# Patient Record
Sex: MALE | Race: WHITE | NOT HISPANIC OR LATINO | Employment: UNEMPLOYED | URBAN - METROPOLITAN AREA
[De-identification: names, ages, dates, MRNs, and addresses within clinical notes are randomized per-mention and may not be internally consistent; named-entity substitution may affect disease eponyms.]

---

## 2018-02-21 ENCOUNTER — HOSPITAL ENCOUNTER (EMERGENCY)
Facility: MEDICAL CENTER | Age: 23
End: 2018-02-23
Attending: EMERGENCY MEDICINE

## 2018-02-21 DIAGNOSIS — R45.851 SUICIDAL IDEATION: ICD-10-CM

## 2018-02-21 DIAGNOSIS — F10.929 ACUTE ALCOHOLIC INTOXICATION WITH COMPLICATION (HCC): ICD-10-CM

## 2018-02-21 LAB
AMPHET UR QL SCN: NEGATIVE
BARBITURATES UR QL SCN: NEGATIVE
BENZODIAZ UR QL SCN: NEGATIVE
BZE UR QL SCN: NEGATIVE
CANNABINOIDS UR QL SCN: NEGATIVE
METHADONE UR QL SCN: NEGATIVE
OPIATES UR QL SCN: NEGATIVE
OXYCODONE UR QL SCN: NEGATIVE
PCP UR QL SCN: NEGATIVE
POC BREATHALIZER: 0.01 PERCENT (ref 0–0.01)
POC BREATHALIZER: 0.3 PERCENT (ref 0–0.01)
PROPOXYPH UR QL SCN: NEGATIVE

## 2018-02-21 PROCEDURE — 99285 EMERGENCY DEPT VISIT HI MDM: CPT

## 2018-02-21 PROCEDURE — 80307 DRUG TEST PRSMV CHEM ANLYZR: CPT

## 2018-02-21 PROCEDURE — 700111 HCHG RX REV CODE 636 W/ 250 OVERRIDE (IP): Performed by: EMERGENCY MEDICINE

## 2018-02-21 PROCEDURE — 302970 POC BREATHALIZER: Performed by: EMERGENCY MEDICINE

## 2018-02-21 PROCEDURE — 96372 THER/PROPH/DIAG INJ SC/IM: CPT

## 2018-02-21 RX ORDER — HALOPERIDOL 5 MG/ML
5 INJECTION INTRAMUSCULAR ONCE
Status: COMPLETED | OUTPATIENT
Start: 2018-02-21 | End: 2018-02-21

## 2018-02-21 RX ADMIN — HALOPERIDOL LACTATE 5 MG: 5 INJECTION, SOLUTION INTRAMUSCULAR at 07:45

## 2018-02-21 ASSESSMENT — PAIN SCALES - GENERAL: PAINLEVEL_OUTOF10: 0

## 2018-02-21 ASSESSMENT — LIFESTYLE VARIABLES
DO YOU DRINK ALCOHOL: YES
TOTAL SCORE: 1
EVER FELT BAD OR GUILTY ABOUT YOUR DRINKING: YES
TOTAL SCORE: 1
HAVE YOU EVER FELT YOU SHOULD CUT DOWN ON YOUR DRINKING: NO
HAVE PEOPLE ANNOYED YOU BY CRITICIZING YOUR DRINKING: NO
TOTAL SCORE: 1
EVER HAD A DRINK FIRST THING IN THE MORNING TO STEADY YOUR NERVES TO GET RID OF A HANGOVER: NO
CONSUMPTION TOTAL: INCOMPLETE

## 2018-02-21 NOTE — ED NOTES
Pt trashing around in bed, verbally threatening staff, redirected, calming measures tried, lights off,per request. All non effective.

## 2018-02-21 NOTE — ED NOTES
"Woke up to speak with registration. Appears annoyed that he was \"in binds for no reason.\" But otherwise, calm at this time.   "

## 2018-02-21 NOTE — ED NOTES
Pt requesting to go to restroom security called and patient coming out of restraints and will be escorted to restroom

## 2018-02-21 NOTE — CONSULTS
"[facility]   TRIAGE ASSESSMENT    Name: Gregg Nelson  MRN: [id number]  : 1995  Age: 22 y.o.  Date of assessment: 2018  PCP: No primary care provider on file.  Persons in attendance: Patient    CHIEF COMPLAINT/PRESENTING ISSUE (as stated by OWEN Mir):   Chief Complaint   Patient presents with   • Suicidal Ideation   • Alcohol Intoxication        CURRENT LIVING SITUATION/SOCIAL SUPPORT: pt lives with his grandmother.  He reports that his biological father commit suicide when pt was a child.  His mother is a paranoid schizophrenic who couldn't care for him.  He also has a step father who he describes as an \"idiot\" and \"piece of shit.\"    BEHAVIORAL HEALTH TREATMENT HISTORY  Does patient/parent report a history of prior behavioral health treatment for patient?   No:    SAFETY ASSESSMENT - SELF  Does patient acknowledge current or past symptoms of dangerousness to self? yes  Does parent/significant other report patient has current or past symptoms of dangerousness to self? N\A  Does presenting problem suggest symptoms of dangerousness to self? Yes:     Past Current    Suicidal Thoughts: [x]  [x]    Suicidal Plans: []  []    Suicidal Intent: []  [x]    Suicide Attempts: []  []    Self-Injury []  []      For any boxes checked above, provide detail: pt reports SI \"for awhile\"    History of suicide by family member: yes - father  History of suicide by friend/significant other: no  Recent change in frequency/specificity/intensity of suicidal thoughts or self-harm behavior? yes - increased  Current access to firearms, medications, or other identified means of suicide/self-harm? no  If yes, willing to restrict access to means of suicide/self-harm? no  Protective factors present:  pt unable to name any at this time.    SAFETY ASSESSMENT - OTHERS  Does patient acknowledge current or past symptoms of aggressive behavior or risk to others? no  Does parent/significant other report patient has current or past " "symptoms of aggressive behavior or risk to others?  no  Does presenting problem suggest symptoms of dangerousness to others? No    Crisis Safety Plan completed and copy given to patient? later    ABUSE/NEGLECT SCREENING  Does patient report feeling “unsafe” in his/her home, or afraid of anyone?  no  Does patient report any history of physical, sexual, or emotional abuse?  no  Does parent or significant other report any of the above? no  Is there evidence of neglect by self?  no  Is there evidence of neglect by a caregiver? no  Does the patient/parent report any history of CPS/APS/police involvement related to suspected abuse/neglect or domestic violence? no  Based on the information provided during the current assessment, is a mandated report of suspected abuse/neglect being made?  No    SUBSTANCE USE SCREENING  Yes:  Francisco all substances used in the past 30 days:      Last Use Amount   [x]   Alcohol daily Til drunk   []   Marijuana     []   Heroin     []   Prescription Opioids  (used without prescription, for    recreation, or in excess of prescribed amount)     []   Other Prescription  (used without prescription, for    recreation, or in excess of prescribed amount)     []   Cocaine      []   Methamphetamine     []   \"\" drugs (ectasy, MDMA)     []   Other substances      Pt sarcastically jokes about his drinking, that he only drinks \"while I'm conscious.\"  UDS results: negative  Breathalyzer results: 0.299@4:38am, 0.0@1358    What consequences does the patient associate with any of the above substance use and or addictive behaviors? Other: ending up here    Risk factors for detox (check all that apply):  []  Seizures   []  Diaphoretic (sweating)   []  Tremors   []  Hallucinations   []  Increased blood pressure   []  Decreased blood pressure   []  Other   []  None      [x] Patient education on risk factors for detoxification and instructed to return to ER as needed.      MENTAL STATUS   Participation: Active " verbal participation and Guarded  Grooming: Disheveled  Orientation: Alert and Fully Oriented  Behavior: Calm  Eye contact: Limited  Mood: Depressed  Affect: Constricted  Thought process: Logical  Thought content: Within normal limits  Speech: Rate within normal limits and Volume within normal limits  Perception: Within normal limits  Memory:  No gross evidence of memory deficits  Insight: Limited  Judgment:  Limited  Other:    Collateral information: no past visits to our ER.  Pt claims this is his first time in a hospital.  Source:  [] Significant other present in person:   [] Significant other by telephone  [] [facility]   [] [facility] Nursing Staff  [x] [facility]  [] Other:     [] Unable to complete full assessment due to:  [] Acute intoxication  [] Patient declined to participate/engage  [] Patient verbally unresponsive  [] Significant cognitive deficits  [] Significant perceptual distortions or behavioral disorganization  [] Other:      CLINICAL IMPRESSIONS:  Primary:  Suicidal Ideation  Secondary:  Depression       IDENTIFIED NEEDS/PLAN:  [Trigger DISPOSITION list for any items marked]    []  Imminent safety risk - self [] Imminent safety risk - others   []  Acute substance withdrawal []  Psychosis/Impaired reality testing   []  Mood/anxiety []  Substance use/Addictive behavior   []  Maladaptive behaviro []  Parent/child conflict   []  Family/Couples conflict []  Biomedical   []  Housing []  Financial   []   Legal  Occupational/Educational   []  Domestic violence []  Other:     Disposition: Actively being addressed by [facility] and [facility]    Does patient express agreement with the above plan? yes    Referral appointment(s) scheduled? N\A    Alert team only:  Pt able to evaluated at 2pm; had been too intoxicated til then.  Pt had been in restraints most of the morning d/t aggressive behaviors with staff.  Pt had also been verbally aggressive with staff and socially  "inappropriate, keeping his genitals purposefully exposed.    Pt was calm and cooperative with assessment.  He was able to voice why he is here, that he fell through the glass table and started cutting himself to commit suicide.  He has a sarcastic, dry sense of humor; self deprecating at times.  He is vaguely irritable and expresses hopelessness.    He reports taking an antidepressant for a while about 7 years ago.  \"But then I felt better so, surprise, I stopped taking it.  Sounds familiar?\"  He can't recall the name of the med.  Pt is insightful about mental illness, likely d/t mother's struggles with paranoid schizophrenia, (which has gone mostly unmedicated, per pt, who describes her as \"slipping into her hole of delusions.\")  Pt denies any SI/HI/AH/VH this morning, despite making serious gestures just last evening.  He doesn't feel he needs rehab for his drinking, actually joking about needing a beer.  He does endorse depression, and I discussed with him the hereditary aspect of mental illness.  He remarked he never thought about it that way, that he always assumed he had situational depression because of his life circumstances.  I offered to give him a list of resources, (which is limited by the fact that he has no insurance.)  He doesn't feel he can get immediate services because he has lost his ID, and will have difficulty replacing because \"my birth certificate isn't a valid original.\"    Pt placed on hold for suicidal ideation.  Hold given to ALEKSANDRA De Paz at 4:45pm.  Dr. Canseco to assess pt tomorrow morning.      I have discussed findings and recommendations with Dr. Singh and Dr. Canseco, who are in agreement with these recommendations.       Gely Low R.N.  2/21/2018              "

## 2018-02-21 NOTE — ED NOTES
"[place] placed again over pt, threw it off using fingers..   \" your going look at my tunde all day\".   emotional support given  "

## 2018-02-21 NOTE — ED NOTES
"Pt offered toilet, stated \"fuck you and all your nice nice bullshit, and fuck those people watching me\"..  Pt educated on restraint release requirements, language, and behavior.   Pt resistant to care, vitals signs were obtained.  Pulses present all limbs, no distress noted. No skin break down noted.   "

## 2018-02-21 NOTE — ED PROVIDER NOTES
"ED Provider Note    Scribed for Andriy Bella M.D. by Angela John. 2/21/2018  4:07 AM    Means of arrival: ambulance   History obtained from: patient   History limited by: none     CHIEF COMPLAINT  Chief Complaint   Patient presents with   • Suicidal Ideation   • Alcohol Intoxication       HPI  Gregg Nelson is a 22 y.o. male who presents to the Emergency Department via ambulance with complaints of suicidal ideation prior to arrival. Per nurses note the patient was drinking at his grandmother's house and began cutting himself with broken glass. He states that he is suicidal on exam and his plan would be \"a drug overdose or alcohol abuse\". He reports recently having multiple negative life events that have exacerbated his suicidal ideation. Patient admits to suicide attempts in the past with \"a lot of alcohol\".  No complaints of fevers. No other acute complaints or concerns.     REVIEW OF SYSTEMS  Pertinent positives include SI.   Pertinent negatives include no fever.    All other systems reviewed and negative.    C    PAST MEDICAL HISTORY   No pertinent past medical history.     SURGICAL HISTORY  patient denies any surgical history    SOCIAL HISTORY  Social History   Substance Use Topics   • Smoking status: Never Smoker   • Smokeless tobacco: Never Used   • Alcohol use Yes      History   Drug use: Unknown       FAMILY HISTORY  No family history noted    CURRENT MEDICATIONS  Current medications can be reviewed in the nurse's note.     ALLERGIES  No known drug allergies    PHYSICAL EXAM  VITAL SIGNS: /107   Pulse (!) 132   Temp 37.1 °C (98.8 °F)   Resp 18   Ht 1.778 m (5' 10\")   Wt (!) 145.2 kg (320 lb)   SpO2 95%   BMI 45.92 kg/m²     Nursing note and vitals reviewed.  Constitutional: Well-developed and well-nourished. No distress. Patient smells of alcohol and has slurred speech. He is walking around the room and turning on the television.   HENT: Head is normocephalic and atraumatic. Oropharynx is " clear and moist without exudate or erythema.   Eyes: Pupils are equal, round, and reactive to light. Conjunctiva are normal.   Cardiovascular: Normal rate and regular rhythm. No murmur heard. Normal radial pulses.  Pulmonary/Chest: Breath sounds normal. No wheezes or rales.   Abdominal: Soft and non-tender. No distention    Musculoskeletal: Extremities exhibit normal range of motion without edema or tenderness.   Neurological: Awake, alert and oriented to person, place, and time. Slurred speech.   Skin: Skin is warm and dry. No rash.   Psychiatric: Patient confirms SI with a plan to overdose on drugs or alcohol.       DIAGNOSTIC STUDIES / PROCEDURES    LABS  Results for orders placed or performed during the hospital encounter of 02/21/18   URINE DRUG SCREEN (TRIAGE)   Result Value Ref Range    Amphetamines Urine Negative Negative    Barbiturates Negative Negative    Benzodiazepines Negative Negative    Cocaine Metabolite Negative Negative    Methadone Negative Negative    Opiates Negative Negative    Oxycodone Negative Negative    Phencyclidine -Pcp Negative Negative    Propoxyphene Negative Negative    Cannabinoid Metab Negative Negative   POC BREATHALIZER   Result Value Ref Range    POC Breathalizer 0.299 (A) 0.00 - 0.01 Percent     All labs reviewed by me.    RADIOLOGY  No orders to display     The radiologist's interpretation of all radiological studies have been reviewed by me.    COURSE & MEDICAL DECISION MAKING  Nursing notes, VS, PMSFHx reviewed in chart.     Review of past medical records shows the patient has no prior ER visits.     4:07 AM - Patient seen and examined at bedside. Ordered urine drug screen  to evaluate his symptoms.     4: 09 AM- Paged life skills for a consult with the patient.     11:20 AM the patient presents today with alcohol intoxication and suicidal ideation. We are awaiting sobriety to continue with psychiatric assessment.     IMPRESSION  1. Acute alcoholic intoxication with  complication (CMS-HCC)    2. Suicidal ideation          IAngela (Scribe), am scribing for, and in the presence of, Andriy Bella M.D..    Electronically signed by: Angela John (Scribe), 2/21/2018    Andriy PRIEST M.D. personally performed the services described in this documentation, as scribed by Angela John in my presence, and it is both accurate and complete.    The note accurately reflects work and decisions made by me.  Andriy Bella  2/21/2018  11:21 AM

## 2018-02-21 NOTE — ED NOTES
Security called, curtains removed, pt educated on safety again... Pt on bed at this time.   Tearful..  security present.. Sitter present as well, charge RN notified of high SAD score.

## 2018-02-21 NOTE — ED TRIAGE NOTES
Pt had been drinking at his house, he currently resides with his grandmother and fell through a glass table where he then attempted to use glass shards to cut himself in a suicide attempt.  Pt has small cuts on hands, lower abdomen..  Pt attempted to slice neck, small superficial cut on right side of neck.    Pt has had previous suicide attempts by slicing wrists, ingestion of pills.   Currently endorses SI, denies hi a/v hallucinations.  searched and gown.

## 2018-02-21 NOTE — ED NOTES
Pt refuses to sit down on , hitting walls attempting to dislodge tv. Restraints for pt safety placed on at 0515

## 2018-02-22 PROCEDURE — 700102 HCHG RX REV CODE 250 W/ 637 OVERRIDE(OP): Performed by: PSYCHIATRY & NEUROLOGY

## 2018-02-22 PROCEDURE — A9270 NON-COVERED ITEM OR SERVICE: HCPCS | Performed by: PSYCHIATRY & NEUROLOGY

## 2018-02-22 RX ORDER — RISPERIDONE 1 MG/1
0.5 TABLET ORAL 2 TIMES DAILY
Status: DISCONTINUED | OUTPATIENT
Start: 2018-02-22 | End: 2018-02-23 | Stop reason: HOSPADM

## 2018-02-22 RX ORDER — ESCITALOPRAM OXALATE 10 MG/1
10 TABLET ORAL DAILY
Status: DISCONTINUED | OUTPATIENT
Start: 2018-02-22 | End: 2018-02-23 | Stop reason: HOSPADM

## 2018-02-22 RX ADMIN — RISPERIDONE 0.5 MG: 1 TABLET, FILM COATED ORAL at 21:06

## 2018-02-22 RX ADMIN — ESCITALOPRAM OXALATE 10 MG: 10 TABLET ORAL at 15:07

## 2018-02-22 NOTE — ED PROVIDER NOTES
ED Provider Note Addendum    Scribed for Andriy Bella M.D. by Justo Robin on 2/22/2018 at 10:31 AM.     This is an addendum to the note on Gregg Nelson.  For further details and full chart information, see patient's initial note.       4:00 AM - I discussed the patient's case with Dr. Hackett  ([facility]) who will transfer care of the patient to me at this time.        10:32 AM  - Patient evaluated by myself.  Patient is resting comfortably at this time with no complaints.    Disposition:  The patient has done well during my period of observation. They are resting comfortably. They continue to await transfer to an inpatient psychiatric facility.     FINAL IMPRESSION  1. Acute alcoholic intoxication with complication (CMS-HCC)    2. Suicidal ideation         I, Justo Robin (Scribe), am scribing for, and in the presence of, Andriy Bella M.D..    Electronically signed by: Justo Robin (Scribe), 2/22/2018    IAndriy M.D. personally performed the services described in this documentation, as scribed by Justo Robin in my presence, and it is both accurate and complete.    The note accurately reflects work and decisions made by me.  Andriy Bella  2/22/2018  11:10 AM

## 2018-02-22 NOTE — DISCHARGE PLANNING
SW received order to extend pt's legal hold. Copy of hold sent to Alice Boogie's office for extension. Pt awaiting transfer to psychiatric facility.

## 2018-02-22 NOTE — ED NOTES
Patient's home medications have been reviewed by the pharmacy team. The patient denies taking any prescription or OTC medications at this time.     Past Medical History:   Diagnosis Date   • Depression (emotion)        Patient's Medications    No medications on file          A:  Medications do not appear to be contributing to current complaints.         P:    No recommendations at this time. Awaiting Psych recommendations.    Shantel Rain, PharmD, BCPS

## 2018-02-22 NOTE — ED PROVIDER NOTES
ED PROVIDER NOTE    Scribed for Loco Singh M.D. by Racheal Powers. 2018, 12:00 PM.    This is an addendum to the note on Gregg Nelson. For further details and full chart entry, see the previously signed ED Provider Note written by Dr. Bella ([facility]). Patient was unable to contract for safety. He states that he still is depressed and he's been drinking heavily. Patient will be admitted and seen by psychiatry within 24 hours    DIAGNOSTIC STUDIES:  Labs:   Results for orders placed or performed during the hospital encounter of 18   URINE DRUG SCREEN (TRIAGE)   Result Value Ref Range    Amphetamines Urine Negative Negative    Barbiturates Negative Negative    Benzodiazepines Negative Negative    Cocaine Metabolite Negative Negative    Methadone Negative Negative    Opiates Negative Negative    Oxycodone Negative Negative    Phencyclidine -Pcp Negative Negative    Propoxyphene Negative Negative    Cannabinoid Metab Negative Negative   POC BREATHALIZER   Result Value Ref Range    POC Breathalizer 0.299 (A) 0.00 - 0.01 Percent   POC BREATHALIZER   Result Value Ref Range    POC Breathalizer 0.008 0.00 - 0.01 Percent       All labs reviewed by me.    R    MEDICAL DECISION MAKIN:00 PM - Patient's case was transferred into my care by Dr. Bella. See previously signed note for further details.     4:30 PM - Patient evaluated. He denies any active SI or thoughts of self-harm at this time. Patient admits to becoming heavily intoxicated earlier this morning due to feeling upset by unemployment and other life stressors. Discussed case further with Dr. Canseco, Psychiatrist, who reviewed patient's file but has not yet seen patient. Dr. Canseco recommended placing patient on hold at this time. Will place patient on hold at this time.     Disposition:  Patient will be transferred to an appropriate psychiatric facility in stable condition for further psychiatric care and evaluation.  Patient will be  placed under the care of my partner awaiting transfer.    FINAL IMPRESSION   1. Acute alcoholic intoxication with complication (CMS-HCC)    2. Suicidal ideation         I, Racheal Powers (Jose J), am scribing for, and in the presence of, Loco Singh M.D..    Electronically signed by: Racheal Powers (Jose J), 2/21/2018    ILoco M.D. personally performed the services described in this documentation, as scribed by Racheal Powers in my presence, and it is both accurate and complete.    The note accurately reflects work and decisions made by me.  Loco Singh  2/21/2018  6:27 PM

## 2018-02-22 NOTE — DISCHARGE PLANNING
Medical Social Work    Referral: Legal Hold    Intervention: Legal Hold Paperwork given to SW by Life Skills RN Gely    Legal Hold Initiated: Date: 2/21/18 Time: 1641    Patient’s Insurance Listed on Face Sheet: none    Referrals sent to: [facility]    This referral contains the following information:  1) Face sheet __x__  2) Page 1 and Page 2 of Legal Hold _x___  3) Alert Team Assessment/Psych Assessment _x___  4) Head to toe physical exam __x__  5) Urine Drug Screen ___x_  6) Blood Alcohol __x__  7) Vital signs ___x_  8) Pregnancy test when applicable _n/a__  9) Medications list __x__    Plan: Patient will transfer to mental health facility once acceptance is obtained

## 2018-02-22 NOTE — NON-PROVIDER
"PSYCHIATRIC CONSULTATION:  Reason for admission: Suicide attempt   Reason for consult: Risk Assessment for suicide   Requesting Physician: ED   Psychiatric Supervising Attending: Dr. Taylor   Legal Hold Status:  Involuntary     Chief Complaint:   \" I attempted suicide.\"     HPI: (and ID)  Gregg Nelson is 23 y/o male with no significant PMH who was brought to ED via EMS after falling on his grandmother's glass table and started using the shards of glass to cut his neck. He reports drinking \"6 tall cans of Steel Reserve 8% alcohol and 3 shooters\" while playing video games beforehand. Patient reports that he usually drinks 3 tall cans daily. Patient shares a few contributing factors that precipitated his increase in drinking, largely that his mom with schizophrenia recently \"stopped taking her meds and relapsed.\" He says he feels okay but has had \"some depression for awhile.\" Currently he denies SI/HI, having a plan or intent to commit suicide.     Psychiatric Review of Current Symptoms:  Depression (vegetative signs of depression, hopelessness, anhedonia, current SI/HI.): endorses depressed mood and says that \"it has slowly gotten worse\" but denies any other symptoms.   Yaneth: denies   Anxiety: endorses some anxiety in regards to his mom going to [facility] and how he hasn't been able to find his ID.   Panic attacks: denies   Psychosis: denies   PTSD (hypervigelance...): denies   OCD: denies     Medical Review of Symptoms: (as reported by the patient).    Neurological: TBIs, Sz, Strokes, other neurological: None.   Other: None.     Psychiatric Examination (MSE):   Musculoskeletal:(abnormalites of movement) patient continues to scratch beard and pick at his hair during the interview.   Appearance:(what they look like) patient's hair was unkept. Several bruises noted on his arms.   Thought Process/Content (TP/TC):  (psychosis, obsessions, looseness,  Disorganized...). thought process was logical and linear. No " "psychosis, obsessions or looseness noted.   Speech: (rate, tone, quantity) speech normal rate with monotone.   Mood/Affect: Depressed. Mood and affect were congruent.   SI/HI: denies.   Attention: grossly intact.   Memory:(memory testing) grossly intact.   Orientation: grossly intact.   Insight and Judgment: fair insight and judgment, patient able to recognize that \"what I did was stupid\" but not able to make steps in contacting   Neurological Testing: (abstraction, clock, MMSE...) not performed.     Past Psych Hx: (meds, responses, SAs, SI, violence, hospitalizations, anxiety, panic, depression, jaja, PTSD, OCD, etc) Patient denies prior SAs, SI or psych hospitalizations. He endorses self harm behavior (cutting) with one episode happening 2-3 months ago and prior episodes happening \"severls years ago.\" He had some therapy and was started on anti-depressants (unable to specify which one) when his mom was newly diagnosed but he stopped taking them \"becasue I felt better.\"     Family Psych Hx:(drugs, alcohol, mental illness: SAs, SI'ds, violence to others) Patient's mother was diagnosed with paranoid schizophrenia roughly 7 years ago when patient was age 14 to 15. Mother takes medication but has periods where she \"has been off her meds.\" His biological father committed suicide but denies having a relationship with him. He states that substance use , especially alcoholism \"runs in my family.\"     Social Hx:(abuse/trauma, marriages, children, support system, financial, employment, legal, etc)   Patient denies abuse but ties his mother's diagnosis of paranoid schizophrenia to being life changing. Patient moved from [place] to [place] in mid-November 2017 with his mom. He is currently staying at his grandma's house with his aunt and uncle. Patient earned his GED and worked at [employer] and then a [employer] and reports being fired due to \"a big mistake.\" Patient reports being in gifted classes when younger. Patient " "reports social interactions through online video games, mostly with his older brother who lives in [place]. Patient denies being  or having children.     Drugs/Alcohol/Tobacco Hx: (alcohol, last use, withdrawals with hallucinations?, withdrawal sz? longest sobriety?), (drugs, type, IV? Longest sobriety?) (rehabs?)   Patient reports ETOH use, drinks roughly 3  12oz can of \"Steel Reserve\" malt liquor per night. He reports quit smoking the beginning of 2/2-18. He smoked roughly 5 cigarettes per day before that since age 13 (4-5 pack year hx). Patient reports h/o of cannabis use but quit using 2 and 1/2 years ago.     Medical Hx: All the vitals, labs, notes, records, and the MAR. Findings of interest to psychiatry include:     Encounter Vitals  Standard Vitals  Vitals  Blood Pressure: 141/97  Temperature: 36.4 °C (97.5 °F)  Temp src: Temporal  Pulse: 88  Respiration: 16  Pulse Oximetry: 98 %  Height: 177.8 cm (5' 10\")  Weight: (!) 145.2 kg (320 lb)  Encounter Vitals  Temperature: 36.4 °C (97.5 °F)  Temp Source: Temporal  Blood Pressure: 141/97  BP Location: Upper Arm, Right  Patient BP Position: Sitting  Pulse: 88  Respiration: 16  Pulse Oximetry: 98 %  O2 Delivery: None (Room Air)  Weight: (!) 145.2 kg (320 lb)  Height: 177.8 cm (5' 10\")  BMI (Calculated): 45.92        Medical Conditions:(documented ones) none   Allergies:  No Known Allergies  Medications: (currently ordered at [facility]):     Current Facility-Administered Medications:   •  escitalopram (LEXAPRO) tablet 10 mg, 10 mg, Oral, DAILY, Kailyn Taylor M.D.  •  risperiDONE (RISPERDAL) tablet 0.5 mg, 0.5 mg, Oral, BID, Kailyn Taylor M.D.  No current outpatient prescriptions on file.  Labs:   No results found for this or any previous visit (from the past 24 hour(s)).   Cranial Imaging: none   EEG: none  EKG: QTc (if not done, say so) none     Assessment:  Psychiatric: Major depressive disorder with alcohol use disorder and   R/O: " prodromal symptoms of schizophrenia        Plan:(extend or dc legal hold?, risks, benefits, expectations discussed? Scripts recommended on dc? Are we going to follow or sign off? Collateral?referrals? Labs? Other orders?)     Extend legal hold.   Start Lexipro 10 mg tab daily PO and Risperdal 0.5 mg tab BID PO.   Contact grandmother and aunt for more information.     Thank you for the consult.

## 2018-02-22 NOTE — DISCHARGE PLANNING
Alert team note:   Patient reports he has been depressed and feeling suicidal for awhile but has not followed up with treatment.  He reports he was on anti-depressants until 7 years ago.  Encouraged him not to use alcohol and get assessed psychiatry.  That he may benefit from medications.  He does not have any injuries from going through glass last night while intoxicated.  He denied being suicidal when he came in and continues to deny he is suicidal.  Continue legal hold until seen by psychiatry.  Awaiting transfer to inpatient psychiatric.

## 2018-02-22 NOTE — DISCHARGE PLANNING
"Patient laying in bed watching Tv.  Polite throughout conversation.  Stated, \"I am not feeling suicidal; I am feeling like a jackass.\"  Patient explained mother has paranoid schizophrenia and father committed suicide.  When asked about his feeling on those particular subjects, patient replied, \"I am scared as hell of getting schizophrenia if it is genetic.\"  Patient has good insight on the disease and process. Educated patient on the effects of alcohol and mental illness.  Denies auditory hallucinations.  Denies delusions or paranoia.  Denies signs and symptoms of jaja and hypomania. Admits to dealing with depression in the past.  Was on an antidepressant but discontinued using it.  When ask why he stopped, patient responded, \"tell me if you have heard this one before, I was feeling better and didn't think I needed it.\"  Patient smiled after making the sarcastic joke.  Although patient was already aware, this writer educated patient on importance of medication compliance. Upon leaving the room, patient thanked me for my visit.  Patient sitter in direct view of patient.  Patient verbally contracted for safety.  "

## 2018-02-22 NOTE — ED NOTES
MD at bedside with residents, pt resting in bed, calm/cooperative, has no complaints at this time, sitter at bedside

## 2018-02-23 VITALS
SYSTOLIC BLOOD PRESSURE: 139 MMHG | HEART RATE: 98 BPM | BODY MASS INDEX: 45.1 KG/M2 | DIASTOLIC BLOOD PRESSURE: 89 MMHG | TEMPERATURE: 97.9 F | OXYGEN SATURATION: 98 % | RESPIRATION RATE: 15 BRPM | HEIGHT: 70 IN | WEIGHT: 315 LBS

## 2018-02-23 PROCEDURE — 700102 HCHG RX REV CODE 250 W/ 637 OVERRIDE(OP): Performed by: PSYCHIATRY & NEUROLOGY

## 2018-02-23 PROCEDURE — A9270 NON-COVERED ITEM OR SERVICE: HCPCS | Performed by: PSYCHIATRY & NEUROLOGY

## 2018-02-23 RX ADMIN — RISPERIDONE 0.5 MG: 1 TABLET, FILM COATED ORAL at 09:00

## 2018-02-23 RX ADMIN — ESCITALOPRAM OXALATE 10 MG: 10 TABLET ORAL at 09:00

## 2018-02-23 NOTE — ED NOTES
Pt's Aunt (kenyon) calls requesting an update on pt, pt gives verbal permission to share information with aunt, Aunt updated on POC

## 2018-02-23 NOTE — ED PROVIDER NOTES
ED Provider Note Addendum    Scribed for Shanell Cuba M.D. by Shellie Severino on 2/23/2018 at 1:36 PM.     This is an addendum to the note on Gregg Nelson.  For further details and full chart information, see patient's initial note.       12:00 PM - I discussed the patient's case with Dr. Bella ([facility]) who will transfer care of the patient to me at this time.        5:30 PM- psychiatry has arranged for outpatient follow-up and feels the patient is safe to go home. Outpatient arrangements have been made and the psychiatrist has discharged his legal hold.    Disposition:  Patient will be transferred to an appropriate psychiatric facility in stable condition for further psychiatric care and evaluation.  Patient will be placed under the care of my partner awaiting transfer.    FINAL IMPRESSION  1. Acute alcoholic intoxication with complication (CMS-HCC)    2. Suicidal ideation         IShellie (Alexandraibe), am scribing for, and in the presence of, Shanell Cuba M.D..    Electronically signed by: Shellie Severino (Scribe), 2/23/2018    IShanell M.D. personally performed the services described in this documentation, as scribed by Shellie Severino in my presence, and it is both accurate and complete.    The note accurately reflects work and decisions made by me.  Shanell Cuba  2/23/2018  7:39 PM

## 2018-02-23 NOTE — DISCHARGE PLANNING
Signed petition sent to Alice Boogie's office for extension. Pt awaiting transfer to psychiatric facility.

## 2018-02-23 NOTE — ED NOTES
Assumed care of pt. Patient sleeping in bed, no apparent distress. Within view of nursing station. Sitter outside room and in direct view of patient.

## 2018-02-23 NOTE — PSYCHIATRY
"PSYCHIATRIC FOLLOW UP:    Reason for Admission: suicidal   Legal hold status:   On hold   Psychiatric Supervising Attending:     Brandon      HPI:       21 y/o man with depression, psychosis, ETOH use disorder, here after he was trying to cut his neck with glass from a table he accidentally broke.  He also started cutting his son in law per collateral information.     Spoke with grandmother who is very supportive. Her number is [phone] her name is Kimberli Fontanez. She is being entered as an emergency contact. She states he has had a lot of trauma and difficulty. She states he has been drinking heavily and snuck out to get more alcohol before this happened.     He is doing much better. Tolerating the lexapro and risperdal. No si/hi. No evidence of psychosis.       Psychiatric Examination: observed phenomenon:  Vitals:  Vitals:    02/22/18 0654 02/22/18 1808 02/23/18 0000 02/23/18 0400   BP: 141/97 142/105 140/108 143/101   Pulse: 88 86 (!) 101 96   Resp: 16 16 18 16   Temp: 36.4 °C (97.5 °F) 36.8 °C (98.2 °F) 36.9 °C (98.4 °F) 36.6 °C (97.9 °F)   TempSrc: Temporal      SpO2: 98% 96% 95% 96%   Weight:       Height:           Musculoskeletal no psychomotor agitation or retardation   Appearance:obses. Grooming fair   Thoughts:Logical and sequential, goal-directed No a/vh, no evidence of delusions, no ideas of reference, no internal stimulation noted   Speech: Nl tone, rate, and volume. Not pressured. Understandable.   Mood:    \"okay\"   Affect:    constricted  SI/HI:   Denies   Attention/Alertness:   Attention wnl   Memory:    Grossly intact.   Orientation:    Grossly intact.   Fund of Knowledge:    Grossly intact.   Cognition: intact   Insight/Judgement into symptoms improved   Neurological Testing:  N/a    Medical systems reviewed:     Sleepy; people were loud in the ED yesterday and he states he couldn't sleep.     Lab results/tests:   None new     Assessment:  MDD with psychotic features. Psychosis " resolved  ETOH use disorder           Plan:  legal hold:discontinued     D/c home with lexapro 10mg po daily and risperdal 1mg po qhs     Warned of s/e including but not limited to worsening mood and treatment emergent jaja.  Pt expressed understanding and agreed to trial, and agreed to f/u with physician if side effects became present. Discussed s/e with grandmother as well.     Warned of weight gain/ metabolic syndrome with risperdal. Recommending once he has insurance or has established with [facility] he should switch to an atypical that has less of a metabolic profile. Risperdal currently used because it is affordable without insurance. Pt and grandmother expressed understanding.     Discussed with them both risk of ETOH in this patient. He will establish care and until he can get an appointment he will go to AA.     Discussed signs of a prodromal syndrome, and things he can do to minimize his risk of a serious psychotic break. He expressed understanding.     Signing off.

## 2018-02-23 NOTE — DISCHARGE PLANNING
"ALERT team note: Mr. Nelson was calm with a somewhat blunted affect and able to fully participate in this brief 1:1. He reports no suicidal ideation \"since I woke up this afternoon\".  Accepting of taking an anti depressant today with no immediate untoward effect complained of or noted......and,  \"the doctor and I decided I need help with drinking\".  He is hoping to be discharged tomorrow.  "

## 2018-02-23 NOTE — ED PROVIDER NOTES
ED PROVIDER NOTE    Scribed for Roman Bustillo M.D. by Racheal Powers. 2018, 12:00 PM.    This is an addendum to the note on Gregg Nelson. For further details and full chart entry, see the previously signed ED Provider Note written by Dr. Bella ([facility]).     All labs reviewed by me.    Radiology:  No orders to display     The radiologist's interpretation of all radiological studies have been reviewed by me.    MEDICAL DECISION MAKIN:00 PM - Patient's case was transferred into my care by Dr. Bella. See previously signed note for further details.     4:54 PM - On recheck, patient is resting comfortably in bed. He does not want any water or another blanket at this time.    Disposition:  Patient will be transferred to an appropriate psychiatric facility in stable condition for further psychiatric care and evaluation.  Patient will be placed under the care of my partner awaiting transfer.    FINAL IMPRESSION   1. Acute alcoholic intoxication with complication (CMS-HCC)    2. Suicidal ideation         IRacheal (Jose J), am scribing for, and in the presence of, Roman Bustillo M.D..    Electronically signed by: Racheal Powers (Jose J), 2018    IRoman M.D. personally performed the services described in this documentation, as scribed by Racheal Powers in my presence, and it is both accurate and complete.    The note accurately reflects work and decisions made by me.  Roman Bustillo  2018  8:06 PM

## 2018-02-23 NOTE — ED NOTES
Patient sleeping in bed, no apparent distress. Within view of nursing station. Sitter outside room and in direct view of patient.

## 2018-02-23 NOTE — DISCHARGE INSTRUCTIONS
Alcohol Use Disorder  Alcohol use disorder is a mental disorder. It is not a one-time incident of heavy drinking. Alcohol use disorder is the excessive and uncontrollable use of alcohol over time that leads to problems with functioning in one or more areas of daily living. People with this disorder risk harming themselves and others when they drink to excess. Alcohol use disorder also can cause other mental disorders, such as mood and anxiety disorders, and serious physical problems. People with alcohol use disorder often misuse other drugs.   Alcohol use disorder is common and widespread. Some people with this disorder drink alcohol to cope with or escape from negative life events. Others drink to relieve chronic pain or symptoms of mental illness. People with a family history of alcohol use disorder are at higher risk of losing control and using alcohol to excess.   Drinking too much alcohol can cause injury, accidents, and health problems. One drink can be too much when you are:  · Working.  · Pregnant or breastfeeding.  · Taking medicines. Ask your doctor.  · Driving or planning to drive.  SYMPTOMS   Signs and symptoms of alcohol use disorder may include the following:   · Consumption of alcohol in larger amounts or over a longer period of time than intended.  · Multiple unsuccessful attempts to cut down or control alcohol use.    · A great deal of time spent obtaining alcohol, using alcohol, or recovering from the effects of alcohol (hangover).  · A strong desire or urge to use alcohol (cravings).    · Continued use of alcohol despite problems at work, school, or home because of alcohol use.    · Continued use of alcohol despite problems in relationships because of alcohol use.  · Continued use of alcohol in situations when it is physically hazardous, such as driving a car.  · Continued use of alcohol despite awareness of a physical or psychological problem that is likely related to alcohol use. Physical  problems related to alcohol use can involve the brain, heart, liver, stomach, and intestines. Psychological problems related to alcohol use include intoxication, depression, anxiety, psychosis, delirium, and dementia.    · The need for increased amounts of alcohol to achieve the same desired effect, or a decreased effect from the consumption of the same amount of alcohol (tolerance).  · Withdrawal symptoms upon reducing or stopping alcohol use, or alcohol use to reduce or avoid withdrawal symptoms. Withdrawal symptoms include:  ¨ Racing heart.  ¨ Hand tremor.  ¨ Difficulty sleeping.  ¨ Nausea.  ¨ Vomiting.  ¨ Hallucinations.  ¨ Restlessness.  ¨ Seizures.  DIAGNOSIS  Alcohol use disorder is diagnosed through an assessment by your health care provider. Your health care provider may start by asking three or four questions to screen for excessive or problematic alcohol use. To confirm a diagnosis of alcohol use disorder, at least two symptoms must be present within a 12-month period. The severity of alcohol use disorder depends on the number of symptoms:  · Mild--two or three.  · Moderate--four or five.  · Severe--six or more.  Your health care provider may perform a physical exam or use results from lab tests to see if you have physical problems resulting from alcohol use. Your health care provider may refer you to a mental health professional for evaluation.  TREATMENT   Some people with alcohol use disorder are able to reduce their alcohol use to low-risk levels. Some people with alcohol use disorder need to quit drinking alcohol. When necessary, mental health professionals with specialized training in substance use treatment can help. Your health care provider can help you decide how severe your alcohol use disorder is and what type of treatment you need. The following forms of treatment are available:   · Detoxification. Detoxification involves the use of prescription medicines to prevent alcohol withdrawal  symptoms in the first week after quitting. This is important for people with a history of symptoms of withdrawal and for heavy drinkers who are likely to have withdrawal symptoms. Alcohol withdrawal can be dangerous and, in severe cases, cause death. Detoxification is usually provided in a hospital or in-patient substance use treatment facility.  · Counseling or talk therapy. Talk therapy is provided by substance use treatment counselors. It addresses the reasons people use alcohol and ways to keep them from drinking again. The goals of talk therapy are to help people with alcohol use disorder find healthy activities and ways to cope with life stress, to identify and avoid triggers for alcohol use, and to handle cravings, which can cause relapse.  · Medicines. Different medicines can help treat alcohol use disorder through the following actions:  ¨ Decrease alcohol cravings.  ¨ Decrease the positive reward response felt from alcohol use.  ¨ Produce an uncomfortable physical reaction when alcohol is used (aversion therapy).  · Support groups. Support groups are run by people who have quit drinking. They provide emotional support, advice, and guidance.  These forms of treatment are often combined. Some people with alcohol use disorder benefit from intensive combination treatment provided by specialized substance use treatment centers. Both inpatient and outpatient treatment programs are available.     This information is not intended to replace advice given to you by your health care provider. Make sure you discuss any questions you have with your health care provider.     Document Released: 01/25/2006 Document Revised: 01/08/2016 Document Reviewed: 03/27/2014  [employer] Interactive Patient Education ©2016 [employer]

## 2018-02-23 NOTE — ED PROVIDER NOTES
ED Provider Note Addendum    Scribed for Andriy Bella M.D. by Justo Robin on 2/23/2018 at 11:00 AM.     This is an addendum to the note on Gregg Nelson.  For further details and full chart information, see patient's initial note.       4:00 AM - I discussed the patient's case with Dr. Murguia ([facility]) who will transfer care of the patient to me at this time.        11:24 AM   - Patient evaluated by myself.  Patient is resting comfortably at this time with no complaints.    Disposition:  The patient has done well during my period of observation. They are resting comfortably. They continue to await transfer to an inpatient psychiatric facility.     FINAL IMPRESSION  1. Acute alcoholic intoxication with complication (CMS-HCC)    2. Suicidal ideation         IJsuto (Scribe), am scribing for, and in the presence of, Andriy Bella M.D..    Electronically signed by: Justo Robin (Scribe), 2/23/2018    IAndriy M.D. personally performed the services described in this documentation, as scribed by Justo Robin in my presence, and it is both accurate and complete.    The note accurately reflects work and decisions made by me.  Andriy Bella  2/23/2018  11:24 AM

## 2018-02-23 NOTE — DISCHARGE PLANNING
[facility]  Crisis/Safety Plan    Name:  Gregg Nelson  MRN:  [id number]  Date:  2018    Warning signs that a crisis may be developing for me or I may be at risk:  1) Depression gets worse  2) using alcohol  3) feeling afraid to get help    Coping strategies I can use on my own (relaxation, physical activity, etc):  1) Video games  2) Reading-news articles  3) Talking to people    Ways I can make my environment safe:  1)Put away knives and other sharp objects  2) Lock up or get rid of extra pills even IBU or Tylenol  3) Get rid of alcohol    Things I want to tell myself when I feel a crisis developin) It's okay to get help  2) I'll get through this  3) I'll be alright    People I can contact for support or distraction (and their phone numbers):  1) Grandma  2) Aunt  3) Older brother    If I’m not able to reach my support people, or the above strategies don’t help, I can contact the following professionals, agencies, or hotlines:  1) Crisis Call Center ():  9-683-694-7187 -OR- (969) 302-6625  2) Crisis Text Line ():  Text START to 569872  3) [facility] outpatient and drop-in center  4) AA sponsor/go to AA groups  5)check-in to  groups    Annablele Smith R.N.

## 2018-02-24 NOTE — DISCHARGE PLANNING
SW received copy of verified extension petition. Pt's hold was released 2/23/18 by Dr. Taylor. Extension petition sent to Medical Records to scan into pt's chart.

## 2018-02-24 NOTE — DISCHARGE PLANNING
Alert team note:  Patient will follow up with [facility] through the walk-in Mon-Fri 8-5.  He has prescriptions provided by Dr Perry.  Discharged by Dr Perry and Dr Cuba.  Completed his crisis safety plan and has resources to follow up at [facility].

## 2023-12-11 NOTE — Clinical Note
Legal hold Admission Reconciliation is Completed  Discharge Reconciliation is Completed Admission Reconciliation is Completed  Discharge Reconciliation is Not Complete